# Patient Record
Sex: FEMALE | Race: WHITE | Employment: OTHER | URBAN - METROPOLITAN AREA
[De-identification: names, ages, dates, MRNs, and addresses within clinical notes are randomized per-mention and may not be internally consistent; named-entity substitution may affect disease eponyms.]

---

## 2018-07-30 ENCOUNTER — APPOINTMENT (OUTPATIENT)
Dept: GENERAL RADIOLOGY | Age: 48
End: 2018-07-30
Payer: MEDICARE

## 2018-07-30 ENCOUNTER — HOSPITAL ENCOUNTER (EMERGENCY)
Age: 48
Discharge: HOME | End: 2018-07-30
Payer: MEDICARE

## 2018-07-30 VITALS
OXYGEN SATURATION: 100 % | RESPIRATION RATE: 16 BRPM | DIASTOLIC BLOOD PRESSURE: 86 MMHG | BODY MASS INDEX: 22.08 KG/M2 | TEMPERATURE: 97.2 F | WEIGHT: 120 LBS | SYSTOLIC BLOOD PRESSURE: 122 MMHG | HEART RATE: 93 BPM | HEIGHT: 62 IN

## 2018-07-30 DIAGNOSIS — M79.641 HAND PAIN, RIGHT: ICD-10-CM

## 2018-07-30 DIAGNOSIS — S63.91XA HAND SPRAIN, RIGHT, INITIAL ENCOUNTER: Primary | ICD-10-CM

## 2018-07-30 PROCEDURE — 73130 X-RAY EXAM OF HAND: CPT

## 2018-07-30 PROCEDURE — L3908 WHO COCK-UP NONMOLDE PRE OTS: HCPCS

## 2018-07-30 PROCEDURE — 99283 EMERGENCY DEPT VISIT LOW MDM: CPT

## 2018-07-30 ASSESSMENT — ENCOUNTER SYMPTOMS
FACIAL SWELLING: 0
DIARRHEA: 0
SORE THROAT: 0
ABDOMINAL PAIN: 0
NAUSEA: 0
EYE REDNESS: 0
COUGH: 0
SHORTNESS OF BREATH: 0
CHEST TIGHTNESS: 0
RHINORRHEA: 0
CONSTIPATION: 0
EYE PAIN: 0
BACK PAIN: 0

## 2018-07-30 ASSESSMENT — PAIN DESCRIPTION - LOCATION: LOCATION: HAND

## 2018-07-30 ASSESSMENT — PAIN DESCRIPTION - ORIENTATION: ORIENTATION: RIGHT

## 2018-07-30 ASSESSMENT — PAIN DESCRIPTION - PAIN TYPE: TYPE: ACUTE PAIN

## 2018-07-30 ASSESSMENT — PAIN SCALES - GENERAL: PAINLEVEL_OUTOF10: 10

## 2018-07-30 NOTE — ED NOTES
Discharge instructions reviewed with Ms. Rinku Swartz. She verbalized understanding. Copy of discharge instructions and prescriptions given. Ms. Rinku Swartz was discharged to home in good condition per personal vehicle, friend/family driving. She exited the ED without difficulty.         Lewis Peterson RN  07/30/18 1047

## 2018-07-30 NOTE — ED PROVIDER NOTES
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    HPI:  (Location, Duration, Timing, Severity, Quality, Assoc Sx, Context, Modifying factors)  This is a  49 y.o. female presenting for evaluation of right hand pain and swelling. Reports she's been having symptoms for the past 5 days. 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 States that she believes that she injured her hand lifting and scooping ice cream.  She reports the pain as a generalized soreness which is worse when she attempts to extend her right wrist.  She denies any numbness or tingling in her hands. Denies any previous injury to her wrist.  She denies taking any medications for her symptoms prior to presenting today. Past Medical/Surgical History:      Diagnosis Date    Depressed          Procedure Laterality Date    COLONOSCOPY  2/17/11    HYSTERECTOMY      NECK SURGERY      UPPER GASTROINTESTINAL ENDOSCOPY  9/15/11       Medications:  Previous Medications    No medications on file         Review of Systems:  Review of Systems   Constitutional: Negative for diaphoresis, fatigue and fever. HENT: Negative for ear pain, facial swelling, postnasal drip, rhinorrhea and sore throat. Eyes: Negative for pain, redness and visual disturbance. Respiratory: Negative for cough, chest tightness and shortness of breath. Cardiovascular: Negative for chest pain, palpitations and leg swelling. reasonable. The patient tolerated their visit well. I saw the patient independently with physician available for consultation as needed. The patient and / or the family were informed of the results of any tests, a time was given to answer questions, a plan was proposed and they agreed with plan. CLINICAL IMPRESSION:  1. Hand sprain, right, initial encounter    2.  Hand pain, right        DISPOSITION Decision To Discharge 07/30/2018 10:35:47 AM      PATIENT REFERRED TO:  [place] [email or web address] ED  [address]  [phone]  Go to   As needed, If symptoms worsen      DISCHARGE MEDICATIONS:  New Prescriptions    No medications on file       DISCONTINUED MEDICATIONS:  Discontinued Medications    No medications on file              (Please note the MDM and HPI sections of this note were completed with a voice recognition program.  Efforts were made to edit the dictations but occasionally words are mis-transcribed.)    Electronically signed, Gabe Valentine PA-C,           Kenneth Hui PA-C  07/30/18 1037

## 2018-07-30 NOTE — ED NOTES
Discharge instructions reviewed with Ms. Rachel Borden. She verbalized understanding. Copy of discharge instructions, wrist splint applied, denies any questions. Ms. Rachel Borden was discharged to home in good condition per personal vehicle, friend/family driving. She exited the ED without difficulty.         Merline Keener, RN  07/30/18 1041

## 2018-10-22 ENCOUNTER — HOSPITAL ENCOUNTER (EMERGENCY)
Age: 48
Discharge: HOME | End: 2018-10-22
Payer: MEDICARE

## 2018-10-22 ENCOUNTER — APPOINTMENT (OUTPATIENT)
Dept: GENERAL RADIOLOGY | Age: 48
End: 2018-10-22
Payer: MEDICARE

## 2018-10-22 VITALS
SYSTOLIC BLOOD PRESSURE: 140 MMHG | OXYGEN SATURATION: 97 % | WEIGHT: 125 LBS | HEART RATE: 88 BPM | HEIGHT: 62 IN | BODY MASS INDEX: 23 KG/M2 | RESPIRATION RATE: 16 BRPM | DIASTOLIC BLOOD PRESSURE: 92 MMHG | TEMPERATURE: 98.4 F

## 2018-10-22 DIAGNOSIS — S20.211A CONTUSION OF RIB ON RIGHT SIDE, INITIAL ENCOUNTER: Primary | ICD-10-CM

## 2018-10-22 DIAGNOSIS — R07.81 RIB PAIN ON RIGHT SIDE: ICD-10-CM
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
## 2018-10-22 PROCEDURE — 96372 THER/PROPH/DIAG INJ SC/IM: CPT

## 2018-10-22 PROCEDURE — 99283 EMERGENCY DEPT VISIT LOW MDM: CPT

## 2018-10-22 PROCEDURE — 6370000000 HC RX 637 (ALT 250 FOR IP): Performed by: PHYSICIAN ASSISTANT

## 2018-10-22 PROCEDURE — 6360000002 HC RX W HCPCS: Performed by: PHYSICIAN ASSISTANT

## 2018-10-22 PROCEDURE — 71101 X-RAY EXAM UNILAT RIBS/CHEST: CPT

## 2018-10-22 RX ORDER — KETOROLAC TROMETHAMINE 30 MG/ML
60 INJECTION, SOLUTION INTRAMUSCULAR; INTRAVENOUS ONCE
Status: COMPLETED | OUTPATIENT
Start: 2018-10-22 | End: 2018-10-22

## 2018-10-22 RX ORDER — LIDOCAINE 50 MG/G
1 PATCH TOPICAL DAILY
Status: DISCONTINUED | OUTPATIENT
Start: 2018-10-22 | End: 2018-10-22 | Stop reason: HOSPADM

## 2018-10-22 RX ORDER — NAPROXEN 500 MG/1
500 TABLET ORAL 2 TIMES DAILY
Qty: 20 TABLET | Refills: 0 | Status: SHIPPED | OUTPATIENT
Start: 2018-10-22 | End: 2019-07-30 | Stop reason: ALTCHOICE

## 2018-10-22 RX ORDER — METHYLPREDNISOLONE 4 MG/1
TABLET ORAL
Qty: 1 KIT | Refills: 0 | Status: SHIPPED | OUTPATIENT
Start: 2018-10-22 | End: 2019-07-30

## 2018-10-22 RX ORDER — LIDOCAINE 50 MG/G
1 PATCH TOPICAL DAILY
Qty: 30 PATCH | Refills: 0 | Status: SHIPPED | OUTPATIENT
Start: 2018-10-22 | End: 2019-07-30

## 2018-10-22 RX ADMIN — KETOROLAC TROMETHAMINE 60 MG: 60 INJECTION, SOLUTION INTRAMUSCULAR at 17:37

## 2018-10-22 ASSESSMENT — PAIN SCALES - GENERAL
PAINLEVEL_OUTOF10: 10
PAINLEVEL_OUTOF10: 10

## 2018-10-22 ASSESSMENT — PAIN DESCRIPTION - PAIN TYPE: TYPE: ACUTE PAIN

## 2019-07-30 ENCOUNTER — OFFICE VISIT (OUTPATIENT)
Dept: FAMILY MEDICINE CLINIC | Age: 49
End: 2019-07-30
Payer: MEDICARE

## 2019-07-30 VITALS
BODY MASS INDEX: 19.6 KG/M2 | HEART RATE: 50 BPM | TEMPERATURE: 98.6 F | HEIGHT: 62 IN | OXYGEN SATURATION: 100 % | WEIGHT: 106.5 LBS | SYSTOLIC BLOOD PRESSURE: 152 MMHG | DIASTOLIC BLOOD PRESSURE: 90 MMHG

## 2019-07-30 DIAGNOSIS — R13.10 DIFFICULTY SWALLOWING SOLIDS: Primary | ICD-10-CM

## 2019-07-30 DIAGNOSIS — D50.8 IRON DEFICIENCY ANEMIA SECONDARY TO INADEQUATE DIETARY IRON INTAKE: ICD-10-CM

## 2019-07-30 DIAGNOSIS — R19.7 DIARRHEA, UNSPECIFIED TYPE: ICD-10-CM

## 2019-07-30 DIAGNOSIS — R03.0 ELEVATED BLOOD PRESSURE READING: ICD-10-CM

## 2019-07-30 LAB
A/G RATIO: 2 (ref 1.1–2.2)
ALBUMIN SERPL-MCNC: 4.4 G/DL (ref 3.4–5)
ALP BLD-CCNC: 126 U/L (ref 40–129)
ALT SERPL-CCNC: 30 U/L (ref 10–40)
ANION GAP SERPL CALCULATED.3IONS-SCNC: 17 MMOL/L (ref 3–16)
AST SERPL-CCNC: 48 U/L (ref 15–37)
BILIRUB SERPL-MCNC: 0.4 MG/DL (ref 0–1)
BUN BLDV-MCNC: 5 MG/DL (ref 7–20)
CALCIUM SERPL-MCNC: 8.8 MG/DL (ref 8.3–10.6)
CHLORIDE BLD-SCNC: 104 MMOL/L (ref 99–110)
CO2: 26 MMOL/L (ref 21–32)
CREAT SERPL-MCNC: 0.5 MG/DL (ref 0.6–1.1)
GFR AFRICAN AMERICAN: >60
GFR NON-AFRICAN AMERICAN: >60
GLOBULIN: 2.2 G/DL
GLUCOSE BLD-MCNC: 105 MG/DL (ref 70–99)
IRON SATURATION: 86 % (ref 15–50)
IRON: 174 UG/DL (ref 37–145)
POTASSIUM SERPL-SCNC: 3.5 MMOL/L (ref 3.5–5.1)
SODIUM BLD-SCNC: 147 MMOL/L (ref 136–145)
TOTAL IRON BINDING CAPACITY: 203 UG/DL (ref 260–445)
TOTAL PROTEIN: 6.6 G/DL (ref 6.4–8.2)

## 2019-07-30 PROCEDURE — 36415 COLL VENOUS BLD VENIPUNCTURE: CPT | Performed by: NURSE PRACTITIONER

## 2019-07-30 PROCEDURE — G8420 CALC BMI NORM PARAMETERS: HCPCS | Performed by: NURSE PRACTITIONER

## 2019-07-30 PROCEDURE — 4004F PT TOBACCO SCREEN RCVD TLK: CPT | Performed by: NURSE PRACTITIONER

## 2019-07-30 PROCEDURE — G8427 DOCREV CUR MEDS BY ELIG CLIN: HCPCS | Performed by: NURSE PRACTITIONER

## 2019-07-30 PROCEDURE — 99204 OFFICE O/P NEW MOD 45 MIN: CPT | Performed by: NURSE PRACTITIONER

## 2019-07-30 RX ORDER — ADHESIVE BANDAGE 3/4"
1 BANDAGE TOPICAL DAILY
Qty: 1 EACH | Refills: 0 | Status: SHIPPED | OUTPATIENT
Start: 2019-07-30 | End: 2022-05-06

## 2019-07-30 ASSESSMENT — ENCOUNTER SYMPTOMS
RHINORRHEA: 0
NAUSEA: 0
COUGH: 0
SORE THROAT: 0
TROUBLE SWALLOWING: 0
WHEEZING: 0
VOMITING: 0
SHORTNESS OF BREATH: 0

## 2019-07-31 LAB
BASOPHILS ABSOLUTE: 0.1 K/UL (ref 0–0.2)
BASOPHILS RELATIVE PERCENT: 0.7 %
EOSINOPHILS ABSOLUTE: 0.1 K/UL (ref 0–0.6)
EOSINOPHILS RELATIVE PERCENT: 1 %
FERRITIN: 258.5 NG/ML (ref 15–150)
FOLATE: 2.22 NG/ML (ref 4.78–24.2)
HCT VFR BLD CALC: 36.4 % (ref 36–48)
HEMATOLOGY PATH CONSULT: NO
HEMOGLOBIN: 12.8 G/DL (ref 12–16)
LYMPHOCYTES ABSOLUTE: 2.4 K/UL (ref 1–5.1)
LYMPHOCYTES RELATIVE PERCENT: 29.5 %
MCH RBC QN AUTO: 44 PG (ref 26–34)
MCHC RBC AUTO-ENTMCNC: 35.3 G/DL (ref 31–36)
MCV RBC AUTO: 124.6 FL (ref 80–100)
MONOCYTES ABSOLUTE: 0.7 K/UL (ref 0–1.3)
MONOCYTES RELATIVE PERCENT: 9.1 %
NEUTROPHILS ABSOLUTE: 4.9 K/UL (ref 1.7–7.7)
NEUTROPHILS RELATIVE PERCENT: 59.7 %
PDW BLD-RTO: 15.1 % (ref 12.4–15.4)
PLATELET # BLD: 223 K/UL (ref 135–450)
PLATELET SLIDE REVIEW: ADEQUATE
PMV BLD AUTO: 9.9 FL (ref 5–10.5)
RBC # BLD: 2.92 M/UL (ref 4–5.2)
SLIDE REVIEW: ABNORMAL
VITAMIN B-12: 237 PG/ML (ref 211–911)
WBC # BLD: 8.2 K/UL (ref 4–11)

## 2019-07-31 RX ORDER — CYANOCOBALAMIN (VITAMIN B-12) 500 MCG
1 TABLET ORAL DAILY
Qty: 90 CAPSULE | Refills: 3 | Status: SHIPPED | OUTPATIENT
Start: 2019-07-31 | End: 2022-05-06

## 2019-07-31 RX ORDER — LANOLIN ALCOHOL/MO/W.PET/CERES
325 CREAM (GRAM) TOPICAL
Qty: 90 TABLET | Refills: 3 | Status: SHIPPED | OUTPATIENT
Start: 2019-07-31 | End: 2022-05-06

## 2019-08-01 LAB — VITAMIN D 1,25-DIHYDROXY: 59.7 PG/ML (ref 19.9–79.3)

## 2019-08-06 ENCOUNTER — TELEPHONE (OUTPATIENT)
Dept: FAMILY MEDICINE CLINIC | Age: 49
End: 2019-08-06

## 2019-08-09 ENCOUNTER — APPOINTMENT (OUTPATIENT)
Dept: GENERAL RADIOLOGY | Age: 49
End: 2019-08-09
Payer: MEDICARE

## 2019-08-09 ENCOUNTER — HOSPITAL ENCOUNTER (EMERGENCY)
Age: 49
Discharge: HOME | End: 2019-08-09
Payer: MEDICARE

## 2019-08-09 VITALS
HEIGHT: 62 IN | SYSTOLIC BLOOD PRESSURE: 138 MMHG | DIASTOLIC BLOOD PRESSURE: 90 MMHG | HEART RATE: 80 BPM | BODY MASS INDEX: 19.69 KG/M2 | RESPIRATION RATE: 16 BRPM | OXYGEN SATURATION: 99 % | WEIGHT: 107 LBS | TEMPERATURE: 97.9 F

## 2019-08-09 DIAGNOSIS — G56.01 CARPAL TUNNEL SYNDROME OF RIGHT WRIST: Primary | ICD-10-CM

## 2019-08-09 PROCEDURE — 97110 THERAPEUTIC EXERCISES: CPT

## 2019-08-09 PROCEDURE — 97161 PT EVAL LOW COMPLEX 20 MIN: CPT

## 2019-08-09 PROCEDURE — 99283 EMERGENCY DEPT VISIT LOW MDM: CPT

## 2019-08-09 PROCEDURE — 73110 X-RAY EXAM OF WRIST: CPT

## 2019-08-09 PROCEDURE — 6360000002 HC RX W HCPCS: Performed by: PHYSICIAN ASSISTANT

## 2019-08-09 PROCEDURE — 96372 THER/PROPH/DIAG INJ SC/IM: CPT

## 2019-08-09 RX ORDER — KETOROLAC TROMETHAMINE 30 MG/ML
30 INJECTION, SOLUTION INTRAMUSCULAR; INTRAVENOUS ONCE
Status: COMPLETED | OUTPATIENT
Start: 2019-08-09 | End: 2019-08-09

## 2019-08-09 RX ORDER — METHYLPREDNISOLONE 4 MG/1
TABLET ORAL
Qty: 1 KIT | Refills: 0 | Status: SHIPPED | OUTPATIENT
Start: 2019-08-09 | End: 2022-05-06

## 2019-08-09 RX ORDER — METHYLPREDNISOLONE SODIUM SUCCINATE 125 MG/2ML
125 INJECTION, POWDER, LYOPHILIZED, FOR SOLUTION INTRAMUSCULAR; INTRAVENOUS ONCE
Status: COMPLETED | OUTPATIENT
Start: 2019-08-09 | End: 2019-08-09

## 2019-08-09 RX ORDER — NAPROXEN 500 MG/1
500 TABLET ORAL 2 TIMES DAILY
Qty: 20 TABLET | Refills: 0 | Status: SHIPPED | OUTPATIENT
Start: 2019-08-09 | End: 2022-05-06

## 2019-08-09 RX ADMIN — KETOROLAC TROMETHAMINE 30 MG: 30 INJECTION, SOLUTION INTRAMUSCULAR at 12:34

## 2019-08-09 RX ADMIN — METHYLPREDNISOLONE SODIUM SUCCINATE 125 MG: 125 INJECTION, POWDER, FOR SOLUTION INTRAMUSCULAR; INTRAVENOUS at 12:34

## 2019-08-09 ASSESSMENT — PAIN DESCRIPTION - ONSET: ONSET: ON-GOING

## 2019-08-09 ASSESSMENT — ENCOUNTER SYMPTOMS
VOMITING: 0
NAUSEA: 0
SHORTNESS OF BREATH: 0
COUGH: 0

## 2019-08-09 ASSESSMENT — PAIN SCALES - GENERAL: PAINLEVEL_OUTOF10: 9

## 2019-08-09 ASSESSMENT — PAIN DESCRIPTION - ORIENTATION: ORIENTATION: RIGHT

## 2019-08-09 ASSESSMENT — PAIN DESCRIPTION - PAIN TYPE: TYPE: ACUTE PAIN

## 2019-08-09 ASSESSMENT — PAIN DESCRIPTION - DESCRIPTORS: DESCRIPTORS: BURNING

## 2019-08-09 ASSESSMENT — PAIN DESCRIPTION - FREQUENCY: FREQUENCY: CONTINUOUS

## 2019-08-09 ASSESSMENT — PAIN DESCRIPTION - LOCATION: LOCATION: WRIST

## 2019-08-09 NOTE — ED PROVIDER NOTES
CONTINUE these medications which have NOT CHANGED    Details   ferrous sulfate (FE TABS) 325 (65 Fe) MG EC tablet Take 1 tablet by mouth daily (with breakfast), Disp-90 tablet, [id number]      Folic Acid 0.8 MG CAPS Take 1 capsule by mouth daily, Disp-90 capsule, R-3Normal      Blood Pressure Monitoring (BLOOD PRESSURE CUFF) MISC DAILY Starting Tue 7/30/2019, Disp-1 each, R-0, Print               ALLERGIES     Patient has no known allergies.     FAMILY HISTORY       Family History   Problem Relation Age of Onset    Cancer Mother     Heart Disease Mother     High Blood Pressure Mother     Arrhythmia Sister     Depression Brother           SOCIAL HISTORY       Social History     Socioeconomic History    Marital status: Single     Spouse name: None    Number of children: None    Years of education: None    Highest education level: None   Occupational History    None   Social Needs    Financial resource strain: None    Food insecurity:     Worry: None     Inability: None    Transportation needs:     Medical: None     Non-medical: None   Tobacco Use    Smoking status: Current Every Day Smoker     Packs/day: 0.50     Years: 25.00     Pack years: 12.50     Types: Cigarettes    Smokeless tobacco: Never Used   Substance and Sexual Activity    Alcohol use: No    Drug use: No    Sexual activity: Not Currently   Lifestyle    Physical activity:     Days per week: None     Minutes per session: None    Stress: None   Relationships    Social connections:     Talks on phone: None     Gets together: None     Attends Yarsani service: None     Active member of club or organization: None     Attends meetings of clubs or organizations: None     Relationship status: None    Intimate partner violence:     Fear of current or ex partner: None     Emotionally abused: None     Physically abused: None     Forced sexual activity: None   Other Topics Concern    None   Social History Narrative    None       SCREENINGS NIH Score       [facility]     Heart Score         PHYSICAL EXAM  (up to 7 for level 4, 8 or more for level 5)     ED Triage Vitals [08/09/19 1122]   BP Temp Temp Source Pulse Resp SpO2 Height Weight   (!) 143/104 97.9 °F (36.6 °C) Temporal 79 16 100 % 5' 2\" (1.575 m) 107 lb (48.5 kg)       Physical Exam   Constitutional: She appears well-developed and well-nourished. HENT:   Head: Normocephalic. Nose: Nose normal.   Mouth/Throat: Oropharynx is clear and moist. No oropharyngeal exudate. Eyes: Pupils are equal, round, and reactive to light. Conjunctivae and EOM are normal. Right eye exhibits no discharge. Left eye exhibits no discharge. Neck: Normal range of motion. Cardiovascular: Normal rate, regular rhythm and normal heart sounds. Exam reveals no gallop and no friction rub. No murmur heard. Pulmonary/Chest: Effort normal and breath sounds normal. No respiratory distress. She has no wheezes. Abdominal: Soft. Bowel sounds are normal. She exhibits no distension. There is no tenderness. Musculoskeletal:        Right elbow: Normal.       Right wrist: She exhibits decreased range of motion, tenderness and swelling. Left wrist: Normal.   +phalen and tinnel   Neurological: She is alert. Skin: Skin is warm and dry. She is not diaphoretic. Psychiatric: She has a normal mood and affect. Her behavior is normal.   Nursing note and vitals reviewed. DIAGNOSTIC RESULTS   LABS:    Labs Reviewed - No data to display    All other labs werewithin normal range or not returned as of this dictation. EKG: All EKG's are interpreted by the Emergency Department Physician who either signs or Co-signs this chart in the absence of acardiologist.  Please see their note for interpretation of EKG.       RADIOLOGY:           Interpretation per the Radiologist below, if available at the time of this note:    XR WRIST RIGHT (MIN 3 VIEWS)   Final Result   No acute osseous injury of the right wrist.           Xr Wrist Right (min 3 Views)    Result Date: 8/9/2019  EXAMINATION: 3 XRAY VIEWS OF THE RIGHT WRIST 8/9/2019 11:52 am COMPARISON: None. HISTORY: ORDERING SYSTEM PROVIDED HISTORY: pain TECHNOLOGIST PROVIDED HISTORY: Reason for exam:->pain Reason for Exam: pain for a week, no injury, woke up with stiff wrist Acuity: Acute Type of Exam: Initial FINDINGS: The alignment of the right wrist is normal.  There is no acute fracture or dislocation. No periarticular erosions are seen. No ligamentous calcifications are identified. The soft tissues are unremarkable. No acute osseous injury of the right wrist.         PROCEDURES   Unless otherwise noted below, none     Procedures    CRITICAL CARE TIME   N/A    CONSULTS:  None      EMERGENCYDEPARTMENT COURSE and DIFFERENTIAL DIAGNOSIS/MDM:   Vitals:    Vitals:    08/09/19 1122 08/09/19 1328   BP: (!) 143/104 (!) 138/90   Pulse: 79 80   Resp: 16 16   Temp: 97.9 °F (36.6 °C)    TempSrc: Temporal    SpO2: 100% 99%   Weight: 107 lb (48.5 kg)    Height: 5' 2\" (1.575 m)        Patient was given the following medications:  Medications   ketorolac (TORADOL) injection 30 mg (30 mg Intramuscular Given 8/9/19 1234)   methylPREDNISolone sodium (SOLU-MEDROL) injection 125 mg (125 mg Intramuscular Given 8/9/19 1234)       Afebrile, stable, patient presents to the ED for evaluation. Seen on my own, per my scope of practice, with attending ED provider available for consultation who agrees with assessment and plan. Patients PO2 is 99% on room air they are not hypoxic, x-rays are evaluated which show no acute findings positive Phalen and Tinel's patient is provided with a wrist splint advised to wear it while sleeping and during the day advised NSAIDs follow-up with orthopedics  All questions are answered. Indications for return to the ED are discussed. Patient is advised if any new or worsening symptoms arise they should immediately return to the emergency room.

## 2019-08-09 NOTE — PROGRESS NOTES
Outpatient Physical Therapy Phone: [phone], Fax: [phone]   ED Physical Therapy Phone: [phone]    CERVICAL, THORACIC SPINE, AND UPPER EXTREMITY PHYSICAL THERAPY EVALUATION  EMERGENCY DEPARTMENT     Received referral for Physical Therapy Evaluation in the Emergency Department. Pt educated to role of PT in the ED, and pt agreeable to proceed with evaluation. Evaluation Date: 8/9/2019   Patient Name: Flores Monzon   YOB: 1970    Medical Diagnosis:  Wrist pain  Treatment Diagnosis: R wrist pain and weakness, possible carpal tunnel syndrome  Onset Date:   2 weeks ago   Referral Date: 8/9/2019   Referring Provider: Rico Sloan PA-C  Restrictions/Precautions: none per PA    SUBJECTIVE FINDINGS    History of Present Illness:      Pt presents to ED with c/o R wrist pain. Reports she woke up two weeks ago with pain in R wrist. Pain then traveled to hand and fingers. Reports numbness/tingling entire R hand and all 5 digits. Pt states she is now limited with ROM of R wrist and has constant pain. Pt does not work, spends little time writing or typing. Pt is right handed. Pain    Patient describes pain to be constant, sharp  Patient reports Not rated/10 pain at present, 10/10 pain at its worst  Worsened by any R wrist movement  Improved by ice, rest  Pain increases by coughing, sneezing, and laughing:  []   Yes [x]   No    Current Functional Limitations:   PLOF:   Pt. Sleep affected? [x]   Yes []   No   []N/A      OBJECTIVE FINDINGS    Imaging Results:  Performed in ED today, 8/9/2019:      FINDINGS:   The alignment of the right wrist is normal.  There is no acute fracture or   dislocation.  No periarticular erosions are seen.  No ligamentous   calcifications are identified.  The soft tissues are unremarkable.        Posture    []   Forward head  []   Forward flexed trunk   []   Pronounced CT junction    []   Increased thoracic kyphosis   []   Increased lumbar lordosis   []

## 2019-08-13 ENCOUNTER — OFFICE VISIT (OUTPATIENT)
Dept: FAMILY MEDICINE CLINIC | Age: 49
End: 2019-08-13

## 2019-08-13 DIAGNOSIS — Z01.419 WELL WOMAN EXAM: Primary | ICD-10-CM

## 2019-08-21 ENCOUNTER — OFFICE VISIT (OUTPATIENT)
Dept: FAMILY MEDICINE CLINIC | Age: 49
End: 2019-08-21
Payer: MEDICARE

## 2019-08-21 VITALS
HEART RATE: 75 BPM | TEMPERATURE: 98.9 F | WEIGHT: 103.38 LBS | DIASTOLIC BLOOD PRESSURE: 82 MMHG | OXYGEN SATURATION: 99 % | SYSTOLIC BLOOD PRESSURE: 124 MMHG | BODY MASS INDEX: 18.91 KG/M2

## 2019-08-21 DIAGNOSIS — Z01.419 WELL WOMAN EXAM: Primary | ICD-10-CM

## 2019-08-21 PROCEDURE — G0101 CA SCREEN;PELVIC/BREAST EXAM: HCPCS | Performed by: NURSE PRACTITIONER

## 2019-08-21 ASSESSMENT — ENCOUNTER SYMPTOMS
CONSTIPATION: 0
BLOOD IN STOOL: 0
NAUSEA: 0
ABDOMINAL PAIN: 0
RECTAL PAIN: 0
BACK PAIN: 0
SHORTNESS OF BREATH: 0

## 2019-08-21 ASSESSMENT — PATIENT HEALTH QUESTIONNAIRE - PHQ9
SUM OF ALL RESPONSES TO PHQ QUESTIONS 1-9: 0
2. FEELING DOWN, DEPRESSED OR HOPELESS: 0
SUM OF ALL RESPONSES TO PHQ9 QUESTIONS 1 & 2: 0
1. LITTLE INTEREST OR PLEASURE IN DOING THINGS: 0
SUM OF ALL RESPONSES TO PHQ QUESTIONS 1-9: 0

## 2019-08-21 NOTE — PATIENT INSTRUCTIONS
time. This is a lot to deal with, but keep at it. You will feel better. Follow-up care is a key part of your treatment and safety. Be sure to make and go to all appointments, and call your doctor if you are having problems. It's also a good idea to know your test results and keep a list of the medicines you take. How can you care for yourself at home? · Ask your family, friends, and coworkers for support. You have a better chance of quitting if you have help and support. · Join a support group, such as Nicotine Anonymous, for people who are trying to quit smoking. · Consider signing up for a smoking cessation program, such as the American Lung Association's Freedom from Smoking program.  · Get text messaging support. Go to the website at www.smokefree. gov to sign up for the [facility] program.  · Set a quit date. Pick your date carefully so that it is not right in the middle of a big deadline or stressful time. Once you quit, do not even take a puff. Get rid of all ashtrays and lighters after your last cigarette. Clean your house and your clothes so that they do not smell of smoke. · Learn how to be a nonsmoker. Think about ways you can avoid those things that make you reach for a cigarette. ? Avoid situations that put you at greatest risk for smoking. For some people, it is hard to have a drink with friends without smoking. For others, they might skip a coffee break with coworkers who smoke. ? Change your daily routine. Take a different route to work or eat a meal in a different place. · Cut down on stress. Calm yourself or release tension by doing an activity you enjoy, such as reading a book, taking a hot bath, or gardening. · Talk to your doctor or pharmacist about nicotine replacement therapy, which replaces the nicotine in your body. You still get nicotine but you do not use tobacco. Nicotine replacement products help you slowly reduce the amount of nicotine you need.  These products come in several forms, many of them available over-the-counter:  ? Nicotine patches  ? Nicotine gum and lozenges  ? Nicotine inhaler  · Ask your doctor about bupropion (Wellbutrin) or varenicline (Chantix), which are prescription medicines. They do not contain nicotine. They help you by reducing withdrawal symptoms, such as stress and anxiety. · Some people find hypnosis, acupuncture, and massage helpful for ending the smoking habit. · Eat a healthy diet and get regular exercise. Having healthy habits will help your body move past its craving for nicotine. · Be prepared to keep trying. Most people are not successful the first few times they try to quit. Do not get mad at yourself if you smoke again. Make a list of things you learned and think about when you want to try again, such as next week, next month, or next year. Where can you learn more? Go to https://picsellpealleneweb.Hiveoo. org and sign in to your [employer] account. Enter L058 in the [employer] box to learn more about \"Stopping Smoking: Care Instructions. \"     If you do not have an account, please click on the \"Sign Up Now\" link. Current as of: September 26, 2018  Content Version: 12.1  © 9743-1036 Healthwise, Incorporated. Care instructions adapted under license by [facility] ([facility]). If you have questions about a medical condition or this instruction, always ask your healthcare professional. [place] [address] any warranty or liability for your use of this information.

## 2019-08-21 NOTE — PROGRESS NOTES
Subjective: Yuko Quiles is a 51 y.o. female presents today for a well woman exam. She has no new complaints or  Concerns. Assessment and Plan:   Diagnosis Orders   1. Well woman exam  Human papillomavirus (HPV) DNA probe thin prep high risk    PAP SMEAR       Last pelvic exam was a long time ago and was normal. Last mammogram was a few years ago and was normal. Last menstrual period was 20-30 years ago ago. She is  postmenopausal.  A0. She is not sexually active. No history or concerns of STI. Does not use birth control. No personal history of cancers. No family history of gynecologic cancers. Past Medical History:   Diagnosis Date    Depressed         Outpatient Medications Marked as Taking for the 19 encounter (Office Visit) with ILA Hernandez CNP   Medication Sig Dispense Refill    methylPREDNISolone (MEDROL, JUANITA,) 4 MG tablet Take by mouth. 1 kit 0    ferrous sulfate (FE TABS) 325 (65 Fe) MG EC tablet Take 1 tablet by mouth daily (with breakfast) 90 tablet 3    Folic Acid 0.8 MG CAPS Take 1 capsule by mouth daily 90 capsule 3    Blood Pressure Monitoring (BLOOD PRESSURE CUFF) MISC 1 each by Does not apply route daily 1 each 0        No Known Allergies     Social History     Tobacco Use    Smoking status: Current Every Day Smoker     Packs/day: 0.50     Years: 25.00     Pack years: 12.50     Types: Cigarettes    Smokeless tobacco: Never Used   Substance Use Topics    Alcohol use: No         Review of Systems   Constitutional: Negative for fatigue and unexpected weight change. Respiratory: Negative for shortness of breath. Cardiovascular: Negative for chest pain and palpitations. Gastrointestinal: Negative for abdominal pain, blood in stool, constipation, nausea and rectal pain. Genitourinary: Negative for dyspareunia, genital sores, menstrual problem, pelvic pain, vaginal bleeding, vaginal discharge and vaginal pain. Musculoskeletal: Negative for back pain. Skin: Negative for rash. Psychiatric/Behavioral: The patient is not nervous/anxious. Objective:  /82   Pulse 75   Temp 98.9 °F (37.2 °C) (Oral)   Wt 103 lb 6 oz (46.9 kg)   SpO2 99%   BMI 18.91 kg/m²     Physical Exam   Constitutional: She is oriented to person, place, and time. She appears well-developed and well-nourished. No distress. Genitourinary: Rectum normal and vagina normal. Pelvic exam was performed with patient supine. There is no rash, tenderness or lesion on the right labia. There is no rash, tenderness or lesion on the left labia. Right adnexum does not display mass, does not display tenderness and does not display fullness. Left adnexum does not display mass, does not display tenderness and does not display fullness. Cervix does not exhibit motion tenderness or friability. HENT:   Head: Normocephalic. Eyes: Pupils are equal, round, and reactive to light. EOM are normal.   Neck: Normal range of motion. Neck supple. Cardiovascular: Normal rate, regular rhythm, normal heart sounds and intact distal pulses. No murmur heard. Pulmonary/Chest: Effort normal and breath sounds normal. No respiratory distress. Right breast exhibits no inverted nipple, no mass, no nipple discharge, no skin change and no tenderness. Left breast exhibits no inverted nipple, no mass, no nipple discharge, no skin change and no tenderness. Abdominal: Soft. Bowel sounds are normal. She exhibits no distension. There is no tenderness. There is no CVA tenderness. Hernia confirmed negative in the right inguinal area and confirmed negative in the left inguinal area. Musculoskeletal: Normal range of motion. She exhibits no edema. Lymphadenopathy:        Right: No inguinal adenopathy present. Left: No inguinal adenopathy present. Neurological: She is alert and oriented to person, place, and time. Skin: Skin is warm and dry. Capillary refill takes 2 to 3 seconds.    Psychiatric: She has a

## 2019-08-26 DIAGNOSIS — R87.629 ABNORMAL THINPREP PAP TEST OF VAGINA: Primary | ICD-10-CM

## 2019-08-26 DIAGNOSIS — R87.618 PAP SMEAR ABNORMALITY OF CERVIX/HUMAN PAPILLOMAVIRUS (HPV) POSITIVE: ICD-10-CM

## 2019-08-26 LAB
HPV COMMENT: ABNORMAL
HPV TYPE 16: NOT DETECTED
HPV TYPE 18: NOT DETECTED
HPVOH (OTHER TYPES): DETECTED

## 2020-10-05 ENCOUNTER — HOSPITAL ENCOUNTER (OUTPATIENT)
Dept: NEUROLOGY | Age: 50
Discharge: HOME | End: 2020-10-05
Payer: MEDICARE

## 2020-10-05 PROCEDURE — 95886 MUSC TEST DONE W/N TEST COMP: CPT

## 2020-10-05 PROCEDURE — 95908 NRV CNDJ TST 3-4 STUDIES: CPT

## 2020-10-05 NOTE — PROCEDURES
Electrodiagnostic Report  [address] [place] [address]  Physical Medicine & Rehabilitation    10/05/20    Jamari Willingham  47 y.o.  1970  [id number]  Referring Provider: Luisito Hatch DO    Clinical Problem:  47 with history of bilateral leg pain left more severe than right onset few months.ago. PMH: no endocrine disease.  No back or leg surgery PE: reflexes trace,normal strength    EMG SUMMARY TABLE RIGHT/LEFT LOWER       Spontaneous     MUAP   Recruitment    Insertional Activity Fibrillation Potential Positive Sharp Waves   Fasiculation High Frequency Potentials   Amp Duration PPP Pattern   Vastus Medialis L2-4 Femoral normal none none none none normal normal normal normal   Anterior Tibialis L4,5 Deep Peroneal normal none none none none normal normal normal normal   Gluteus Medius L4-S1 Superior Gut normal none none none none normal normal normal normal   Peroneus Longus L5-S1 Sup Peroneal normal none none none none normal normal normal normal   Posterior Tibialis L5-S1 Tibial normal none none none none normal normal normal normal   Medial Gastroc S1,2 Tibial normal none none none none normal normal normal normal   Extensor Hallicis Q2-V9 Peroneal normal none none none none normal normal normal normal   Extensor Dig Brevis L5-S1 Peroneal normal none none none none normal normal normal normal   LS Paraspinals Posterior Rami       Diff relax none none none none normal normal normal normal       Nerve Conduction Studies     Nerve Sensory Distal Latency (msec) Sensory Distal Latency (msec) Amp uv Amp uv Motor Distal Latency (msec) Motor Distal Latency (msec) Amp uv Amp uv Motor NCV (m/sec) Motor NCV (m/sec)    Right Left Right Left Right Left Right Left Right Left   Sural 3.2 (n<4.2) 3.7 (n<4.2) 23  45         Peroneal     4.7 (n<5.5) 4.8  (n<5.5) 4.4  4.4  51 (n>40) 51 (n>40)   Above Knee         (n>40) (n>40)   Tibial     (n<6.2) (n<6.2)   (n>40) (n>40)   H-Reflex                 Summary of EMG and Nerve Conduction Findings: The above EMG and nerve conduction studies were within normal limits. Overall Impression: Normal study without evidence of an acute radiculopathy, entrapment neuropathy or other lower motor neuron dysfunction Thank you. Electronically signed by:  Joanie Velez DO,10/5/2020,10:15 AM

## 2022-05-06 ENCOUNTER — OFFICE VISIT (OUTPATIENT)
Dept: FAMILY MEDICINE CLINIC | Age: 52
End: 2022-05-06
Payer: MEDICARE

## 2022-05-06 VITALS
WEIGHT: 110 LBS | SYSTOLIC BLOOD PRESSURE: 122 MMHG | DIASTOLIC BLOOD PRESSURE: 80 MMHG | HEIGHT: 62 IN | BODY MASS INDEX: 20.24 KG/M2

## 2022-05-06 DIAGNOSIS — Z12.11 COLON CANCER SCREENING: ICD-10-CM

## 2022-05-06 DIAGNOSIS — D50.8 IRON DEFICIENCY ANEMIA SECONDARY TO INADEQUATE DIETARY IRON INTAKE: ICD-10-CM

## 2022-05-06 DIAGNOSIS — Z72.0 TOBACCO ABUSE: ICD-10-CM

## 2022-05-06 DIAGNOSIS — G89.29 CHRONIC NONINTRACTABLE HEADACHE, UNSPECIFIED HEADACHE TYPE: Primary | ICD-10-CM

## 2022-05-06 DIAGNOSIS — R92.8 ABNORMAL MAMMOGRAM: ICD-10-CM

## 2022-05-06 DIAGNOSIS — M54.50 ACUTE BILATERAL LOW BACK PAIN WITHOUT SCIATICA: ICD-10-CM

## 2022-05-06 DIAGNOSIS — Z76.89 ENCOUNTER TO ESTABLISH CARE: ICD-10-CM

## 2022-05-06 DIAGNOSIS — B97.7 HPV (HUMAN PAPILLOMA VIRUS) INFECTION: ICD-10-CM

## 2022-05-06 DIAGNOSIS — E89.40 POST HYSTERECTOMY MENOPAUSE: ICD-10-CM

## 2022-05-06 DIAGNOSIS — Z90.710 POST HYSTERECTOMY MENOPAUSE: ICD-10-CM

## 2022-05-06 DIAGNOSIS — R51.9 CHRONIC NONINTRACTABLE HEADACHE, UNSPECIFIED HEADACHE TYPE: Primary | ICD-10-CM

## 2022-05-06 PROCEDURE — 4004F PT TOBACCO SCREEN RCVD TLK: CPT

## 2022-05-06 PROCEDURE — 99205 OFFICE O/P NEW HI 60 MIN: CPT

## 2022-05-06 PROCEDURE — G8427 DOCREV CUR MEDS BY ELIG CLIN: HCPCS

## 2022-05-06 PROCEDURE — 3017F COLORECTAL CA SCREEN DOC REV: CPT

## 2022-05-06 PROCEDURE — G8420 CALC BMI NORM PARAMETERS: HCPCS

## 2022-05-06 RX ORDER — SUMATRIPTAN 25 MG/1
25 TABLET, FILM COATED ORAL
Qty: 27 TABLET | Refills: 0 | Status: SHIPPED | OUTPATIENT
Start: 2022-05-06 | End: 2022-05-20 | Stop reason: SDUPTHER

## 2022-05-06 RX ORDER — MELOXICAM 7.5 MG/1
7.5 TABLET ORAL DAILY
Qty: 30 TABLET | Refills: 3 | Status: SHIPPED | OUTPATIENT
Start: 2022-05-06

## 2022-05-06 ASSESSMENT — PATIENT HEALTH QUESTIONNAIRE - PHQ9
5. POOR APPETITE OR OVEREATING: 0
SUM OF ALL RESPONSES TO PHQ QUESTIONS 1-9: 7
8. MOVING OR SPEAKING SO SLOWLY THAT OTHER PEOPLE COULD HAVE NOTICED. OR THE OPPOSITE, BEING SO FIGETY OR RESTLESS THAT YOU HAVE BEEN MOVING AROUND A LOT MORE THAN USUAL: 0
2. FEELING DOWN, DEPRESSED OR HOPELESS: 0
SUM OF ALL RESPONSES TO PHQ QUESTIONS 1-9: 7
4. FEELING TIRED OR HAVING LITTLE ENERGY: 3
9. THOUGHTS THAT YOU WOULD BE BETTER OFF DEAD, OR OF HURTING YOURSELF: 0
7. TROUBLE CONCENTRATING ON THINGS, SUCH AS READING THE NEWSPAPER OR WATCHING TELEVISION: 0
SUM OF ALL RESPONSES TO PHQ9 QUESTIONS 1 & 2: 1
10. IF YOU CHECKED OFF ANY PROBLEMS, HOW DIFFICULT HAVE THESE PROBLEMS MADE IT FOR YOU TO DO YOUR WORK, TAKE CARE OF THINGS AT HOME, OR GET ALONG WITH OTHER PEOPLE: 1
SUM OF ALL RESPONSES TO PHQ QUESTIONS 1-9: 7
SUM OF ALL RESPONSES TO PHQ QUESTIONS 1-9: 7
3. TROUBLE FALLING OR STAYING ASLEEP: 3
6. FEELING BAD ABOUT YOURSELF - OR THAT YOU ARE A FAILURE OR HAVE LET YOURSELF OR YOUR FAMILY DOWN: 0
1. LITTLE INTEREST OR PLEASURE IN DOING THINGS: 1

## 2022-05-06 ASSESSMENT — ENCOUNTER SYMPTOMS
EYE PAIN: 1
ALLERGIC/IMMUNOLOGIC NEGATIVE: 1
PHOTOPHOBIA: 1
ABDOMINAL PAIN: 0
VISUAL CHANGE: 0
RESPIRATORY NEGATIVE: 1
NAUSEA: 1
BACK PAIN: 1
BLURRED VISION: 0
VOMITING: 0

## 2022-05-06 NOTE — PROGRESS NOTES
[facility] Medicine  Establish care visit   2022    Alexis Beckman (:  1970) is a 45 y.o. female, here to establish care. Chief Complaint   Patient presents with   [facility].     former pt of chris nguyen in [place]     Headache    Back Pain     thorasic spine     Mass     right side of pt neck        ASSESSMENT/ PLAN  1. Chronic nonintractable headache, unspecified headache type  -Question whether could be related to a migraine headache considering that she does have eye pain that precedes headaches  -She does not have a headache today  -We will try Imitrex as needed    - SUMAtriptan (IMITREX) 25 MG tablet; Take 1 tablet by mouth once as needed for Migraine  Dispense: 27 tablet; Refill: 0  - meloxicam (MOBIC) 7.5 MG tablet; Take 1 tablet by mouth daily  Dispense: 30 tablet; Refill: 3    2. Acute bilateral low back pain without sciatica  -She previously was taking Advil about 5 times a day for total of 1000 mg  -She was instructed to stop taking Advil as she was. We will start Mobic 7.5 daily  - meloxicam (MOBIC) 7.5 MG tablet; Take 1 tablet by mouth daily  Dispense: 30 tablet; Refill: 3    3. HPV (human papilloma virus) infection  -Previous HPV infection in 2019. He states that she has never followed up on this with her OB/GYN because she was never told about  -Refer to gynecology  - Hernandez Raygoza DO, Gynecology, [place] [facility]    4. Tobacco abuse  -Spent about 10 minutes discussing tobacco cessation  - CBC with Auto Differential; Future  - Comprehensive Metabolic Panel; Future  - Lipid, Fasting; Future  - TSH with Reflex; Future    5. Encounter to establish care  -Patient here to establish care after being seen at [address] with Auto Differential; Future  - Comprehensive Metabolic Panel; Future  - Lipid, Fasting; Future  - TSH with Reflex; Future  - Vitamin D 25 Hydroxy; Future    6.  Colon cancer screening  -Cologuard ordered  - Fecal DNA Colorectal cancer screening (Cologuard)    7. Abnormal mammogram  -Abnormal mammogram in 2012 that showed a 7 mm oval density in the right breast at 4:00. She had a ultrasound following this mammogram that was negative. At that time she was instructed to follow-up in 6 months for repeat mammogram and ultrasound. She failed to follow-up. Has not been over 10 years since she has had checked  -Repeat mammogram.  Likely will need a repeat ultrasound as well  - SERGEY DIGITAL DIAGNOSTIC W OR WO CAD BILATERAL; Future    8. Post hysterectomy menopause    - DEXA BONE DENSITY AXIAL SKELETON; Future  - Vitamin D 25 Hydroxy; Future    9. Iron deficiency anemia secondary to inadequate dietary iron intake  -Recheck CBC  -Previous iron studies were mildly decreased          Preventative Care:  Pap in 2019, HPV detected. Labs      Imaging:    Ultrasound breast 2/23/2012    Findings-   There is a 7 mm oval shaped density in the right breast at   approximately 4-00. This nodule persists following compression   views. Careful evaluation of this region with ultrasound shows no   definite mass or nodule. There is a lobular area of what appears   to be Fibroglandular tissue which could potentially correspond to   the mammographic abnormality.       Assessment-   BI-RADS category 3       Recommendations-   Diagnostic mammogram of the right breast is recommended in 6   months to ensure stability of this finding, as this was the   patient's baseline mammogram.             Return in about 8 weeks (around 7/1/2022), or if symptoms worsen or fail to improve, for headache, back pain, mammogram.    HPI  Patient is here to establish care. Previous patient of Kathy Oconnor. Family hx of cervical cancer, sister    Patient had an abnormal mammogram in 2012 where a oval shaped nodule was found in the right breast at 4:00. She did have a follow-up ultrasound was negative. Recommended follow-up at that time was 6 months.   She did not follow-up at 6 months and has not had a mammogram since 2012. She denies any breast discharge. however, she does state that she has some intermittent breast pain underneath the left breast.    No family history of breast cancer    She had previously been diagnosed with HPV in 2019. She has never had any follow-up at that time with OB/GYN. Unknown whether she had cleared the virus. She would like to see a new OB/GYN. Status post hysterectomy    Does smoke about a pack a day since she was about 14years old. Occasionally uses alcohol    Patient reports headache that has been ongoing for about 3 months. She states that she does feel like she has a high pain that precedes the headache with every occurrence. She was taking Advil about 1000 mg daily with minimal relief in symptoms. Headache   This is a new problem. The current episode started more than 1 month ago. The problem occurs constantly. The problem has been gradually improving. The pain is located in the parietal (Right eye and jaw) region. The pain radiates to the right neck. The pain quality is not similar to prior headaches. The quality of the pain is described as sharp. The pain is at a severity of 8/10. The pain is severe. Associated symptoms include back pain, eye pain, nausea, phonophobia and photophobia. Pertinent negatives include no abdominal pain, blurred vision, numbness, tingling, visual change, vomiting or weakness. Nothing aggravates the symptoms. She has tried NSAIDs for the symptoms. The treatment provided mild relief. There is no history of cancer, cluster headaches, hypertension, migraine headaches or recent head traumas. Back Pain  This is a new problem. The current episode started more than 1 month ago. The problem occurs intermittently. The problem has been gradually worsening since onset. The pain is present in the lumbar spine. The quality of the pain is described as aching. The pain does not radiate. The pain is at a severity of 8/10.  The pain is moderate. The pain is worse during the night. The symptoms are aggravated by twisting, standing and bending. Stiffness is present at night. Associated symptoms include headaches. Pertinent negatives include no abdominal pain, dysuria, numbness, perianal numbness, tingling or weakness. Risk factors include history of cancer. She has tried NSAIDs for the symptoms. The treatment provided mild relief. ROS  Review of Systems   Constitutional: Negative. HENT: Negative. Eyes: Positive for photophobia and pain. Negative for blurred vision. Respiratory: Negative. Cardiovascular: Negative. Gastrointestinal: Positive for nausea. Negative for abdominal pain and vomiting. Endocrine: Negative. Genitourinary: Negative for dysuria. Musculoskeletal: Positive for back pain. Skin: Negative. Allergic/Immunologic: Negative. Neurological: Positive for headaches. Negative for tingling, weakness and numbness. Hematological: Negative. Psychiatric/Behavioral: Negative. HISTORIES  No current outpatient medications on file prior to visit. No current facility-administered medications on file prior to visit.         No Known Allergies    Past Medical History:   Diagnosis Date    Depressed        Patient Active Problem List   Diagnosis    Iron deficiency anemia secondary to inadequate dietary iron intake    Diarrhea    Difficulty swallowing solids    Elevated blood pressure reading    HPV (human papilloma virus) infection       Past Surgical History:   Procedure Laterality Date    COLONOSCOPY  2/17/11    HYSTERECTOMY      NECK SURGERY      UPPER GASTROINTESTINAL ENDOSCOPY  9/15/11       Social History     Socioeconomic History    Marital status: Single     Spouse name: Not on file    Number of children: Not on file    Years of education: Not on file    Highest education level: Not on file   Occupational History    Not on file   Tobacco Use    Smoking status: Current Every Day Smoker     Packs/day: 0.50     Years: 25.00     Pack years: 12.50     Types: Cigarettes    Smokeless tobacco: Never Used   Substance and Sexual Activity    Alcohol use: No    Drug use: No    Sexual activity: Not Currently   Other Topics Concern    Not on file   Social History Narrative    Not on file     Social Determinants of Health     Financial Resource Strain:     Difficulty of Paying Living Expenses: Not on file   Food Insecurity:     Worried About Running Out of Food in the Last Year: Not on file    Irena of Food in the Last Year: Not on file   Transportation Needs:     Lack of Transportation (Medical): Not on file    Lack of Transportation (Non-Medical):  Not on file   Physical Activity:     Days of Exercise per Week: Not on file    Minutes of Exercise per Session: Not on file   Stress:     Feeling of Stress : Not on file   Social Connections:     Frequency of Communication with Friends and Family: Not on file    Frequency of Social Gatherings with Friends and Family: Not on file    Attends Gnosticist Services: Not on file    Active Member of [address] [place] [address] or Organizations: Not on file    Attends Club or Organization Meetings: Not on file    Marital Status: Not on file   Intimate Partner Violence:     Fear of Current or Ex-Partner: Not on file    Emotionally Abused: Not on file    Physically Abused: Not on file    Sexually Abused: Not on file   Housing Stability:     Unable to Pay for Housing in the Last Year: Not on file    Number of [place] in the Last Year: Not on file    Unstable Housing in the Last Year: Not on file        Family History   Problem Relation Age of Onset    Cancer Mother     Heart Disease Mother     High Blood Pressure Mother     Arrhythmia Sister     Depression Brother        PE  Vitals:    05/06/22 1357   BP: 122/80   Site: Left Upper Arm   Position: Sitting   Cuff Size: Medium Adult   Weight: 110 lb (49.9 kg)   Height: 5' 2\" (1.575 m)     Estimated body mass index is 20.12 kg/m² as calculated from the following:    Height as of this encounter: 5' 2\" (1.575 m). Weight as of this encounter: 110 lb (49.9 kg). Physical Exam  Constitutional:       General: She is not in acute distress. Appearance: Normal appearance. She is not ill-appearing. HENT:      Head: Normocephalic. Right Ear: Tympanic membrane and external ear normal.      Left Ear: Tympanic membrane and external ear normal.      Nose: No congestion or rhinorrhea. Mouth/Throat:      Mouth: Mucous membranes are moist.      Pharynx: Oropharynx is clear. No posterior oropharyngeal erythema. Eyes:      Extraocular Movements: Extraocular movements intact. Conjunctiva/sclera: Conjunctivae normal.      Pupils: Pupils are equal, round, and reactive to light. Cardiovascular:      Rate and Rhythm: Normal rate and regular rhythm. Pulses: Normal pulses. Heart sounds: Normal heart sounds. No murmur heard. Pulmonary:      Effort: Pulmonary effort is normal. No respiratory distress. Breath sounds: Normal breath sounds. No wheezing. Abdominal:      General: Abdomen is flat. Bowel sounds are normal.      Palpations: Abdomen is soft. Tenderness: There is no abdominal tenderness. Hernia: No hernia is present. Musculoskeletal:         General: No swelling. Normal range of motion. Cervical back: Normal range of motion and neck supple. No tenderness. Right lower leg: No edema. Left lower leg: No edema. Lymphadenopathy:      Cervical: No cervical adenopathy. Skin:     General: Skin is warm and dry. Capillary Refill: Capillary refill takes less than 2 seconds. Neurological:      General: No focal deficit present. Mental Status: She is alert and oriented to person, place, and time. Mental status is at baseline. Cranial Nerves: No cranial nerve deficit.    Psychiatric:         Mood and Affect: Mood normal.         Behavior: Behavior normal. Judgment: Judgment normal.           There is no immunization history on file for this patient. Health Maintenance   Topic Date Due    Annual Wellness Visit (AWV)  Never done    COVID-19 Vaccine (1) Never done    Pneumococcal 0-64 years Vaccine (1 - PCV) Never done    Depression Screen  Never done    DTaP/Tdap/Td vaccine (1 - Tdap) Never done    Lipids  Never done    Colorectal Cancer Screen  Never done    Breast cancer screen  11/03/2019    Shingles vaccine (1 of 2) Never done    Hepatitis C screen  05/06/2023 (Originally 11/3/1988)    HIV screen  05/06/2023 (Originally 11/3/1984)    Flu vaccine (Season Ended) 09/01/2022    Hepatitis A vaccine  Aged Out    Hepatitis B vaccine  Aged Out    Hib vaccine  Aged Out    Meningococcal (ACWY) vaccine  Aged Out       PSH, PMH, SH and FH reviewed and noted. Recent and past labs, tests and consults also reviewed. Recent or new meds also reviewed. Phill Pardo, APRN - CNP    This dictation was generated by voice recognition computer software. Although all attempts are made to edit the dictation for accuracy, there may be errors in the transcription that are not intended.

## 2022-05-20 DIAGNOSIS — G89.29 CHRONIC NONINTRACTABLE HEADACHE, UNSPECIFIED HEADACHE TYPE: ICD-10-CM

## 2022-05-20 DIAGNOSIS — R51.9 CHRONIC NONINTRACTABLE HEADACHE, UNSPECIFIED HEADACHE TYPE: ICD-10-CM

## 2022-05-20 RX ORDER — SUMATRIPTAN 25 MG/1
25 TABLET, FILM COATED ORAL
Qty: 27 TABLET | Refills: 0 | Status: SHIPPED | OUTPATIENT
Start: 2022-05-20 | End: 2022-08-26

## 2022-08-26 DIAGNOSIS — G89.29 CHRONIC NONINTRACTABLE HEADACHE, UNSPECIFIED HEADACHE TYPE: ICD-10-CM

## 2022-08-26 DIAGNOSIS — R51.9 CHRONIC NONINTRACTABLE HEADACHE, UNSPECIFIED HEADACHE TYPE: ICD-10-CM

## 2022-08-26 RX ORDER — SUMATRIPTAN 25 MG/1
25 TABLET, FILM COATED ORAL
Qty: 27 TABLET | Refills: 0 | Status: SHIPPED | OUTPATIENT
Start: 2022-08-26 | End: 2022-08-26

## 2022-11-23 ENCOUNTER — COMMUNITY OUTREACH (OUTPATIENT)
Dept: FAMILY MEDICINE CLINIC | Age: 52
End: 2022-11-23

## 2022-11-23 NOTE — PROGRESS NOTES
Patient's HM shows they are overdue for Mammogram and Colorectal Screening. [employer] and  files searched. No results to attach to order nor HM updated.

## 2022-12-04 DIAGNOSIS — R51.9 CHRONIC NONINTRACTABLE HEADACHE, UNSPECIFIED HEADACHE TYPE: ICD-10-CM

## 2022-12-04 DIAGNOSIS — G89.29 CHRONIC NONINTRACTABLE HEADACHE, UNSPECIFIED HEADACHE TYPE: ICD-10-CM

## 2022-12-05 RX ORDER — SUMATRIPTAN 25 MG/1
25 TABLET, FILM COATED ORAL
Qty: 27 TABLET | Refills: 0 | Status: SHIPPED | OUTPATIENT
Start: 2022-12-05 | End: 2022-12-05